# Patient Record
Sex: MALE | Race: WHITE | NOT HISPANIC OR LATINO | Employment: FULL TIME | URBAN - METROPOLITAN AREA
[De-identification: names, ages, dates, MRNs, and addresses within clinical notes are randomized per-mention and may not be internally consistent; named-entity substitution may affect disease eponyms.]

---
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
## 2023-02-03 ENCOUNTER — OFFICE VISIT (OUTPATIENT)
Dept: INTERNAL MEDICINE | Facility: CLINIC | Age: 23
End: 2023-02-03
Payer: MEDICAID

## 2023-02-03 VITALS
SYSTOLIC BLOOD PRESSURE: 120 MMHG | BODY MASS INDEX: 26.01 KG/M2 | DIASTOLIC BLOOD PRESSURE: 86 MMHG | HEIGHT: 72 IN | WEIGHT: 192 LBS | TEMPERATURE: 98.2 F | RESPIRATION RATE: 16 BRPM | HEART RATE: 80 BPM

## 2023-02-03 DIAGNOSIS — H61.012: Primary | ICD-10-CM

## 2023-02-03 PROCEDURE — 99204 OFFICE O/P NEW MOD 45 MIN: CPT | Performed by: INTERNAL MEDICINE

## 2023-02-03 RX ORDER — PROCHLORPERAZINE 25 MG/1
SUPPOSITORY RECTAL
COMMUNITY
Start: 2023-01-06

## 2023-02-03 RX ORDER — PROCHLORPERAZINE 25 MG/1
SUPPOSITORY RECTAL
COMMUNITY
Start: 2022-11-08

## 2023-02-03 RX ORDER — CIPROFLOXACIN 500 MG/1
500 TABLET, FILM COATED ORAL 2 TIMES DAILY
Qty: 14 TABLET | Refills: 0 | Status: SHIPPED | OUTPATIENT
Start: 2023-02-03

## 2023-02-03 NOTE — PROGRESS NOTES
"Chief Complaint  Ear Drainage (Less than a week.)    Subjective    Jasper Valdivia is a 22 y.o. male.     Jasper Valdivia presents to [facility] for ear drainage in less than a week.      History of Present Illness    The following portions of the patient's history were reviewed and updated as appropriate: allergies, current medications, past family history, past medical history, past social history, past surgical history and problem list.    Left ear drainage  The patient has been experiencing some ache and pain in his left ear. His left ear feels tender when he touches it. He noticed a crusty buildup and drainage coming out of his left ear when he is laying down. He denies any trauma to his left ear. He has had ear infections in the past. He denies any fever or chills.    Review of Systems   HENT: Positive for ear discharge.        Objective   Vital Signs:   /86 (BP Location: Right arm, Patient Position: Sitting, Cuff Size: Adult)   Pulse 80   Temp 98.2 °F (36.8 °C) (Infrared)   Resp 16   Ht 182.9 cm (72\")   Wt 87.1 kg (192 lb)   BMI 26.04 kg/m²     Body mass index is 26.04 kg/m².    Physical Exam  HENT:      Head: Normocephalic and atraumatic.      Ears:      Comments: Specifically to the left ear, in the pinna and tragus region, he does have some redness and soreness there compared to the right, so symptoms are suggestive of possible mild acute pinna perichondritis.     Mouth/Throat:      Mouth: Mucous membranes are moist.   Eyes:      Extraocular Movements: Extraocular movements intact.      Pupils: Pupils are equal, round, and reactive to light.   Neck:      Comments: No goiter.  Musculoskeletal:      Cervical back: Neck supple.               Assessment and Plan  Diagnoses and all orders for this visit:    1. Left ear drainage  - After review of history and physical and assessment and plan here today, I did recommend some type of anti-inflammatory, " either Tylenol or Motrin.  - Because of the area and location of his ear, I am going to start him on a short course of Cipro 500 mg 1 tablet by mouth once daily, and this is also to allow adequate coverage against pseudomonas, although there is no active overt infection here today, but also there are concerns of difference on his ears in appearance, so I do want to cover for pseudomonas infection.    2. Follow Up  - Otherwise, everything else looks good today, 02/03/2023.  - I will see the patient as needed.    Diagnoses and all orders for this visit:    1. Acute perichondritis of pinna, left (Primary)  -     ciprofloxacin (Cipro) 500 MG tablet; Take 1 tablet by mouth 2 (Two) Times a Day.  Dispense: 14 tablet; Refill: 0          Transcribed from ambient dictation for Jacinto Gallardo MD by Jaki Mancuso.  02/03/23   13:29 EST    Patient or patient representative verbalized consent to the visit recording.  I have personally performed the services described in this document as transcribed by the above individual, and it is both accurate and complete.

## 2023-02-07 RX ORDER — FLUOXETINE HYDROCHLORIDE 40 MG/1
40 CAPSULE ORAL DAILY
Qty: 30 CAPSULE | Refills: 3 | Status: SHIPPED | OUTPATIENT
Start: 2023-02-07

## 2023-02-07 NOTE — TELEPHONE ENCOUNTER
Caller: Rosa Valdivia    Relationship: Mother    Best call back number: [phone]    Requested Prescriptions:   Requested Prescriptions     Pending Prescriptions Disp Refills   • FLUoxetine (PROzac) 40 MG capsule 30 capsule 3     Sig: Take 1 capsule by mouth Daily.        Pharmacy where request should be sent: [facility] [id number] [facility] [address] [facility] AT [address] [phone] [facility] [phone]      Additional details provided by patient: PATIENT IS OUT OF THIS MEDICATION    Does the patient have less than a 3 day supply:  [x] Yes  [] No    Would you like a call back once the refill request has been completed: [] Yes [x] No    If the office needs to give you a call back, can they leave a voicemail: [] Yes [x] No    Pardeep Goins Rep   02/07/23 08:16 EST

## 2023-05-24 ENCOUNTER — OFFICE VISIT (OUTPATIENT)
Dept: INTERNAL MEDICINE | Facility: CLINIC | Age: 23
End: 2023-05-24
Payer: MEDICAID

## 2023-05-24 VITALS
WEIGHT: 176 LBS | TEMPERATURE: 98.4 F | HEART RATE: 76 BPM | DIASTOLIC BLOOD PRESSURE: 74 MMHG | RESPIRATION RATE: 12 BRPM | BODY MASS INDEX: 23.87 KG/M2 | SYSTOLIC BLOOD PRESSURE: 108 MMHG

## 2023-05-24 DIAGNOSIS — R11.0 NAUSEA: ICD-10-CM

## 2023-05-24 DIAGNOSIS — F41.0 PANIC ATTACKS: Primary | ICD-10-CM

## 2023-05-24 DIAGNOSIS — F41.1 GENERALIZED ANXIETY DISORDER: ICD-10-CM

## 2023-05-24 PROCEDURE — 1159F MED LIST DOCD IN RCRD: CPT | Performed by: PHYSICIAN ASSISTANT

## 2023-05-24 PROCEDURE — 99214 OFFICE O/P EST MOD 30 MIN: CPT | Performed by: PHYSICIAN ASSISTANT

## 2023-05-24 PROCEDURE — 1160F RVW MEDS BY RX/DR IN RCRD: CPT | Performed by: PHYSICIAN ASSISTANT

## 2023-05-24 RX ORDER — HYDROXYZINE PAMOATE 25 MG/1
25 CAPSULE ORAL 3 TIMES DAILY PRN
Qty: 60 CAPSULE | Refills: 0 | Status: SHIPPED | OUTPATIENT
Start: 2023-05-24

## 2023-05-24 RX ORDER — HYDROXYZINE PAMOATE 25 MG/1
25 CAPSULE ORAL 3 TIMES DAILY PRN
Qty: 60 CAPSULE | Refills: 0 | Status: SHIPPED | OUTPATIENT
Start: 2023-05-24 | End: 2023-05-24

## 2023-05-24 RX ORDER — ONDANSETRON 4 MG/1
4 TABLET, ORALLY DISINTEGRATING ORAL EVERY 8 HOURS PRN
Qty: 20 TABLET | Refills: 0 | Status: SHIPPED | OUTPATIENT
Start: 2023-05-24

## 2023-05-24 RX ORDER — LEVOTHYROXINE SODIUM 0.2 MG/1
200 TABLET ORAL DAILY
COMMUNITY
Start: 2023-03-15

## 2023-05-24 NOTE — PROGRESS NOTES
Office Note     Name: Jasper Valdivia    : 2000     MRN: [id number]     Chief Complaint  Anxiety (With nausea since the birth of his son )    Subjective     History of Present Illness:  Jasper Valdivia is a 22 y.o. male.    The patient presents today for nausea.    His son was born 2023. Since he has been experiencing increased anxiety and nausea. He has difficulty sleeping. He notes his fiancé is dealing with postpartum depression which has made his anxiety worse. His son was born via  section. His fiancé also has autoimmune hypothyroidism.     He has a history of type 1 diabetes. He notes after his son was born his blood glucose levels were unstable but have come back into normal ranges. He sees an endocrinologist at [facility] and has an appointment next month     He takes anxiety medication daily. He previously took hydroxyzine for anxiety as needed.    Patient denies HI or SI.       Past Medical History:   Past Medical History:   Diagnosis Date   • Anxiety    • Depression    • Diabetes mellitus type I    • Thyroid disease        Past Surgical History:   Past Surgical History:   Procedure Laterality Date   • HERNIA REPAIR     • TONSILLECTOMY         Immunizations:   Immunization History   Administered Date(s) Administered   • 31-influenza Vac Quardvalent Preservativ 10/01/2020   • COVID-19 (MODERNA) 1st,2nd,3rd Dose Monovalent 2021   • COVID-19 (PFIZER) Purple Cap Monovalent 2021, 2021   • DTaP, Unspecified 2005   • FluLaval/Fluzone >6mos 2020   • Hep A, 2 Dose 2018   • IPV 2005   • MMR 2005   • Meningococcal Polysaccharide 2012   • Tdap 2012   • Varicella 2003, 2012        Medications:     Current Outpatient Medications:   •  B-D UF III MINI PEN NEEDLES 31G X 5 MM misc, , Disp: , Rfl:   •  Blood Glucose Monitoring Suppl (FREESTYLE FREEDOM LITE) W/DEVICE kit, , Disp: , Rfl:   •  Continuous Blood Gluc Sensor (Dexcom G6  Sensor), , Disp: , Rfl:   •  Continuous Blood Gluc Transmit (Dexcom G6 Transmitter) misc, , Disp: , Rfl:   •  FLUoxetine (PROzac) 40 MG capsule, Take 1 capsule by mouth Daily., Disp: 30 capsule, Rfl: 3  •  FREESTYLE LITE test strip, 1 each by Other route As Needed., Disp: , Rfl:   •  GLUCAGEN HYPOKIT 1 MG injection, 1 mg As Needed., Disp: , Rfl:   •  hydrOXYzine pamoate (Vistaril) 25 MG capsule, Take 1 capsule by mouth 3 (Three) Times a Day As Needed for Anxiety., Disp: 60 capsule, Rfl: 0  •  Insulin Glargine (Lantus SoloStar) 100 UNIT/ML injection pen, Inject 46 units once a day, MDD 80 units, Disp: , Rfl:   •  Insulin Lispro, 1 Unit Dial, (HUMALOG) 100 UNIT/ML solution pen-injector, 1 unit for every 8 carbs, plus correction 1:20> 120, MDD 80 units, Disp: , Rfl:   •  KETOSTIX strip, 1 strip As Needed., Disp: , Rfl:   •  levothyroxine (SYNTHROID, LEVOTHROID) 200 MCG tablet, Take 1 tablet by mouth., Disp: , Rfl:   •  levothyroxine (SYNTHROID, LEVOTHROID) 200 MCG tablet, Take 1 tablet by mouth Daily., Disp: , Rfl:   •  ONETOUCH DELICA LANCETS 33G misc, , Disp: , Rfl:   •  Insulin Lispro (HUMALOG) 100 UNIT/ML solution pen-injector, Inject  under the skin into the appropriate area as directed. 1 unit per every 10 carbs, Disp: , Rfl:   •  ondansetron ODT (ZOFRAN-ODT) 4 MG disintegrating tablet, Place 1 tablet on the tongue Every 8 (Eight) Hours As Needed for Nausea or Vomiting., Disp: 20 tablet, Rfl: 0    Allergies:   No Known Allergies    Family History:   Family History   Problem Relation Age of Onset   • Depression Maternal Aunt    • Diabetes Maternal Aunt    • Depression Maternal Grandmother    • Heart disease Maternal Grandfather    • Prostate cancer Maternal Grandfather    • Brain cancer Paternal Grandfather    • Cancer Paternal Grandfather    • Lung cancer Paternal Grandfather    • Ovarian cancer Other    • No Known Problems Mother        Social History:   Social History     Socioeconomic History   • Marital  "status: Single   Tobacco Use   • Smoking status: Never     Passive exposure: Yes   • Smokeless tobacco: Never   Vaping Use   • Vaping Use: Some days   • Substances: Nicotine   • Devices: Disposable   Substance and Sexual Activity   • Alcohol use: No   • Drug use: No       Objective     Vital Signs  /74 (BP Location: Right arm, Patient Position: Sitting, Cuff Size: Adult)   Pulse 76   Temp 98.4 °F (36.9 °C) (Infrared)   Resp 12   Wt 79.8 kg (176 lb)   BMI 23.87 kg/m²   Estimated body mass index is 23.87 kg/m² as calculated from the following:    Height as of 2/3/23: 182.9 cm (72\").    Weight as of this encounter: 79.8 kg (176 lb).    BMI is within normal parameters. No other follow-up for BMI required.      Physical Exam  Vitals and nursing note reviewed.   Constitutional:       Appearance: Normal appearance.   Cardiovascular:      Rate and Rhythm: Normal rate and regular rhythm.      Pulses: Normal pulses.      Heart sounds: Normal heart sounds.   Pulmonary:      Effort: Pulmonary effort is normal.      Breath sounds: Normal breath sounds.   Skin:     General: Skin is warm and dry.   Neurological:      Mental Status: He is alert.   Psychiatric:         Mood and Affect: Mood is anxious.         Behavior: Behavior normal.         Thought Content: Thought content does not include homicidal or suicidal ideation.         Cognition and Memory: Cognition and memory normal.         Judgment: Judgment normal.          Assessment and Plan     Problem List Items Addressed This Visit        Gastrointestinal Abdominal     Nausea    Overview     Seemingly secondary to anxiety attempting treatment with ondansetron PRN         Relevant Medications    ondansetron ODT (ZOFRAN-ODT) 4 MG disintegrating tablet       Mental Health    Panic attacks - Primary    Overview     Currently taking daily Paxil consistently plan to add Hydroxizine PRN, patient has taken previously and tolerated well.  Panic attackls are likely related " ti circumstances of having a new baby and fiancee having panic attacks.  Patient has a veyr positive outlook on parenting and speaks fondly of family.  Both he and fiancee have family to support to help with child. Patient knows that parenting will be easier as fiancee heals and baby grows and develops he is just struggling with worrying about his fiancee and child and requesting PRN medication.  Patient politely declined therapy and denies any HI or SI         Relevant Medications    hydrOXYzine pamoate (Vistaril) 25 MG capsule    Generalized anxiety disorder    Relevant Medications    hydrOXYzine pamoate (Vistaril) 25 MG capsule       I spent approximately 45 minutes providing clinical care for this patient; including review of patient's chart and provider documentation, face to face time spent with patient in examination room (obtaining history, performing physical exam, discussing diagnosis, counseling, and management options), placing orders, and completing patient documentation    Follow Up  No follow-ups on file.    Katy Trent PA-C  [facility]  [address] [place] [id number] [phone]  Transcribed from ambient dictation for Katy Trent PA-C by [facility].  05/24/23   11:23 EDT    Patient or patient representative verbalized consent to the visit recording.  I have personally performed the services described in this document as transcribed by the above individual, and it is both accurate and complete.

## 2023-05-25 PROBLEM — R11.0 NAUSEA: Status: ACTIVE | Noted: 2023-05-25

## 2023-05-25 PROBLEM — F41.1 GENERALIZED ANXIETY DISORDER: Status: ACTIVE | Noted: 2023-05-25

## 2023-05-25 PROBLEM — F41.0 PANIC ATTACKS: Status: ACTIVE | Noted: 2023-05-25

## 2023-06-12 LAB
PH UR STRIP.AUTO: 6 [PH] (ref 5–8)
PROT UR QL STRIP: NEGATIVE
SPECIFIC GRAVITY: 1.01

## 2023-06-16 DIAGNOSIS — R11.0 NAUSEA: ICD-10-CM

## 2023-06-16 RX ORDER — ONDANSETRON 4 MG/1
4 TABLET, ORALLY DISINTEGRATING ORAL EVERY 8 HOURS PRN
Qty: 20 TABLET | Refills: 0 | Status: SHIPPED | OUTPATIENT
Start: 2023-06-16

## 2024-01-31 NOTE — TELEPHONE ENCOUNTER
LOV 05/24/2023  NOV     Tried to reach patient no answer left voicemail to return call    RELAY:  We recently received your message to refill your medication(s).  Our records indicate that you did not schedule a follow up appointment with your provider at your last visit on 05/24/2023. The medication that you are on requires a follow up appointment for additional refills. Patient was to schedule on or around 10/24/2023 for 4-6 month follow up. Please schedule patient for a fasting physical  If he is unable to keep hie appointment we will not be able to provider further refills.  Once appointment has been scheduled please update message with date and time so we can process the request.  We will forward the message to the provider to review the refill request.

## 2024-02-02 NOTE — TELEPHONE ENCOUNTER
2nd attempt     LOV 05/24/2023  NOV      Tried to reach patient no answer left voicemail to return call     RELAY:  We recently received your message to refill your medication(s).  Our records indicate that you did not schedule a follow up appointment with your provider at your last visit on 05/24/2023. The medication that you are on requires a follow up appointment for additional refills. Patient was to schedule on or around 10/24/2023 for 4-6 month follow up. Please schedule patient for a fasting physical  If he is unable to keep hie appointment we will not be able to provider further refills.  Once appointment has been scheduled please update message with date and time so we can process the request.  We will forward the message to the provider to review the refill request.

## 2024-02-05 RX ORDER — FLUOXETINE HYDROCHLORIDE 40 MG/1
40 CAPSULE ORAL DAILY
Qty: 90 CAPSULE | Refills: 2 | Status: SHIPPED | OUTPATIENT
Start: 2024-02-05

## 2024-02-05 NOTE — TELEPHONE ENCOUNTER
3rdd attempt      LOV 05/24/2023  NOV      Tried to reach patient no answer left voicemail to return call     RELAY:  We recently received your message to refill your medication(s).  Our records indicate that you did not schedule a follow up appointment with your provider at your last visit on 05/24/2023. The medication that you are on requires a follow up appointment for additional refills. Patient was to schedule on or around 10/24/2023 for 4-6 month follow up. Please schedule patient for a fasting physical  If he is unable to keep hie appointment we will not be able to provider further refills.  Once appointment has been scheduled please update message with date and time so we can process the request.  We will forward the message to the provider to review the refill request.

## 2024-03-19 ENCOUNTER — OFFICE VISIT (OUTPATIENT)
Dept: INTERNAL MEDICINE | Facility: CLINIC | Age: 24
End: 2024-03-19
Payer: MEDICAID

## 2024-03-19 VITALS
BODY MASS INDEX: 21.72 KG/M2 | DIASTOLIC BLOOD PRESSURE: 64 MMHG | WEIGHT: 160.13 LBS | SYSTOLIC BLOOD PRESSURE: 110 MMHG | HEART RATE: 64 BPM | RESPIRATION RATE: 16 BRPM | TEMPERATURE: 98.7 F

## 2024-03-19 DIAGNOSIS — F43.9 STRESS: ICD-10-CM

## 2024-03-19 DIAGNOSIS — F41.1 GENERALIZED ANXIETY DISORDER: ICD-10-CM

## 2024-03-19 DIAGNOSIS — Z13.220 LIPID SCREENING: ICD-10-CM

## 2024-03-19 DIAGNOSIS — E10.9 TYPE 1 DIABETES MELLITUS WITHOUT COMPLICATION: Primary | ICD-10-CM

## 2024-03-19 LAB
ALBUMIN UR-MCNC: 2.9 MG/DL
CHOLEST SERPL-MCNC: 125 MG/DL (ref 0–200)
CREAT UR-MCNC: 98.8 MG/DL
HDLC SERPL-MCNC: 56 MG/DL (ref 40–60)
LDLC SERPL CALC-MCNC: 59 MG/DL (ref 0–100)
LDLC/HDLC SERPL: 1.08 {RATIO}
MICROALBUMIN/CREAT UR: 29.4 MG/G (ref 0–29)
TRIGL SERPL-MCNC: 42 MG/DL (ref 0–150)
VLDLC SERPL-MCNC: 10 MG/DL (ref 5–40)

## 2024-03-19 PROCEDURE — 80061 LIPID PANEL: CPT | Performed by: INTERNAL MEDICINE

## 2024-03-19 PROCEDURE — 82043 UR ALBUMIN QUANTITATIVE: CPT | Performed by: INTERNAL MEDICINE

## 2024-03-19 PROCEDURE — 82570 ASSAY OF URINE CREATININE: CPT | Performed by: INTERNAL MEDICINE

## 2024-03-19 NOTE — PROGRESS NOTES
Chief Complaint  Med Refill    Subjective    Jasper Valdivia is a 23 y.o. male.     Jasper Valdivia presents to [facility] for       History of Present Illness    The following portions of the patient's history were reviewed and updated as appropriate: allergies, current medications, past family history, past medical history, past social history, past surgical history, and problem list.    1 diabetes-chronic and stable.  Patient says that his sugars are doing well and no side effects from current medications.    2 anxiety/stress-patient says that he has been under a lot of stress lately and would like to consider counseling for his overall mental health.  Patient denies any suicidal ideations, emotional liability threats towards himself or anybody else    Review of Systems    Objective   Vital Signs:   /64 (BP Location: Right arm, Patient Position: Sitting, Cuff Size: Adult)   Pulse 64   Temp 98.7 °F (37.1 °C) (Temporal)   Resp 16   Wt 72.6 kg (160 lb 2 oz)   BMI 21.72 kg/m²     Body mass index is 21.72 kg/m².  BMI is within normal parameters. No other follow-up for BMI required.     Physical Exam  HENT:      Head: Normocephalic and atraumatic.      Mouth/Throat:      Mouth: Mucous membranes are moist.   Eyes:      Extraocular Movements: Extraocular movements intact.      Pupils: Pupils are equal, round, and reactive to light.   Cardiovascular:      Heart sounds: Normal heart sounds, S1 normal and S2 normal. No murmur heard.     No friction rub. No gallop.   Pulmonary:      Breath sounds: Normal breath sounds.      Comments: Obese.   Musculoskeletal:      Cervical back: Neck supple.   Neurological:      Mental Status: He is alert and oriented to person, place, and time.               Assessment and Plan  Diagnoses and all orders for this visit:    1. Diabetes mellitus.  - The patient continues to follow up with the [facility].   -  The patient requested to have his lipid profile.    2. Anxiety and stress.  - The patient has requested behavioral health referral, so I have placed that.   - Currently, the patient prefers to have a telemedicine visit regarding management and so we will go ahead and do that.        Follow Up   No follow-ups on file.  Patient was given instructions and counseling regarding his condition or for health maintenance advice. Please see specific information pulled into the AVS if appropriate.     Transcribed from ambient dictation for Jacinto Gallardo MD by Taylor Flores.  03/19/24   11:14 EDT    Patient or patient representative verbalized consent to the visit recording.  I have personally performed the services described in this document as transcribed by the above individual, and it is both accurate and complete.

## 2024-03-21 ENCOUNTER — PATIENT MESSAGE (OUTPATIENT)
Dept: INTERNAL MEDICINE | Facility: CLINIC | Age: 24
End: 2024-03-21
Payer: MEDICAID

## 2024-03-21 ENCOUNTER — TELEPHONE (OUTPATIENT)
Dept: INTERNAL MEDICINE | Facility: CLINIC | Age: 24
End: 2024-03-21
Payer: MEDICAID

## 2024-03-21 NOTE — TELEPHONE ENCOUNTER
From: Jasper Valdivia  To: Jacinto Gallardo  Sent: 3/21/2024 8:52 AM EDT  Subject: Lab results     Hello can you please fax the lab results completed at your clinic to [facility] as well for my End[facility]? Fax [phone]    Thank you   Jasper Valdivia

## 2024-03-21 NOTE — TELEPHONE ENCOUNTER
----- Message from Jasper Valdivia sent at 3/21/2024  8:52 AM EDT -----  Regarding: Lab results   Contact: [phone]  Gary can you please fax the lab results completed at your clinic to [facility] as well for my Endo?  Fax [phone]    Thank you   Jasper Valdivia

## 2025-01-27 RX ORDER — FLUOXETINE 40 MG/1
40 CAPSULE ORAL DAILY
Qty: 90 CAPSULE | Refills: 2 | OUTPATIENT
Start: 2025-01-27